# Patient Record
Sex: MALE | Race: WHITE | NOT HISPANIC OR LATINO | Employment: FULL TIME | URBAN - METROPOLITAN AREA
[De-identification: names, ages, dates, MRNs, and addresses within clinical notes are randomized per-mention and may not be internally consistent; named-entity substitution may affect disease eponyms.]

---

## 2020-07-27 ENCOUNTER — OFFICE VISIT (OUTPATIENT)
Dept: FAMILY MEDICINE CLINIC | Facility: CLINIC | Age: 25
End: 2020-07-27

## 2020-07-27 ENCOUNTER — LAB (OUTPATIENT)
Dept: LAB | Facility: HOSPITAL | Age: 25
End: 2020-07-27

## 2020-07-27 VITALS
HEIGHT: 65 IN | WEIGHT: 130.21 LBS | DIASTOLIC BLOOD PRESSURE: 68 MMHG | SYSTOLIC BLOOD PRESSURE: 96 MMHG | BODY MASS INDEX: 21.69 KG/M2 | OXYGEN SATURATION: 99 % | HEART RATE: 58 BPM

## 2020-07-27 DIAGNOSIS — Z83.3 FAMILY HISTORY OF DIABETES MELLITUS: ICD-10-CM

## 2020-07-27 DIAGNOSIS — Z13.1 SCREENING FOR DIABETES MELLITUS: ICD-10-CM

## 2020-07-27 DIAGNOSIS — I95.9 HYPOTENSION, UNSPECIFIED HYPOTENSION TYPE: ICD-10-CM

## 2020-07-27 DIAGNOSIS — Z13.21 ENCOUNTER FOR VITAMIN DEFICIENCY SCREENING: ICD-10-CM

## 2020-07-27 DIAGNOSIS — G43.019 INTRACTABLE MIGRAINE WITHOUT AURA AND WITHOUT STATUS MIGRAINOSUS: ICD-10-CM

## 2020-07-27 DIAGNOSIS — Z13.6 SCREENING FOR CARDIOVASCULAR CONDITION: ICD-10-CM

## 2020-07-27 DIAGNOSIS — G43.019 INTRACTABLE MIGRAINE WITHOUT AURA AND WITHOUT STATUS MIGRAINOSUS: Primary | ICD-10-CM

## 2020-07-27 LAB
25(OH)D3 SERPL-MCNC: 16.6 NG/ML (ref 30–100)
ALBUMIN SERPL-MCNC: 4.4 G/DL (ref 3.5–5.2)
ALBUMIN/GLOB SERPL: 1.4 G/DL
ALP SERPL-CCNC: 66 U/L (ref 39–117)
ALT SERPL W P-5'-P-CCNC: 17 U/L (ref 1–41)
ANION GAP SERPL CALCULATED.3IONS-SCNC: 6.6 MMOL/L (ref 5–15)
AST SERPL-CCNC: 19 U/L (ref 1–40)
BACTERIA UR QL AUTO: ABNORMAL /HPF
BASOPHILS # BLD AUTO: 0.05 10*3/MM3 (ref 0–0.2)
BASOPHILS NFR BLD AUTO: 0.8 % (ref 0–1.5)
BILIRUB SERPL-MCNC: 0.3 MG/DL (ref 0–1.2)
BILIRUB UR QL STRIP: NEGATIVE
BUN SERPL-MCNC: 12 MG/DL (ref 6–20)
BUN/CREAT SERPL: 12.6 (ref 7–25)
CALCIUM SPEC-SCNC: 9.7 MG/DL (ref 8.6–10.5)
CHLORIDE SERPL-SCNC: 104 MMOL/L (ref 98–107)
CHOLEST SERPL-MCNC: 170 MG/DL (ref 0–200)
CLARITY UR: CLEAR
CO2 SERPL-SCNC: 28.4 MMOL/L (ref 22–29)
COLOR UR: YELLOW
CREAT SERPL-MCNC: 0.95 MG/DL (ref 0.76–1.27)
DEPRECATED RDW RBC AUTO: 41.4 FL (ref 37–54)
EOSINOPHIL # BLD AUTO: 0.21 10*3/MM3 (ref 0–0.4)
EOSINOPHIL NFR BLD AUTO: 3.4 % (ref 0.3–6.2)
ERYTHROCYTE [DISTWIDTH] IN BLOOD BY AUTOMATED COUNT: 13.3 % (ref 12.3–15.4)
GFR SERPL CREATININE-BSD FRML MDRD: 99 ML/MIN/1.73
GLOBULIN UR ELPH-MCNC: 3.1 GM/DL
GLUCOSE SERPL-MCNC: 71 MG/DL (ref 65–99)
GLUCOSE UR STRIP-MCNC: NEGATIVE MG/DL
HBA1C MFR BLD: 5.5 % (ref 4.8–5.6)
HCT VFR BLD AUTO: 43.5 % (ref 37.5–51)
HDLC SERPL-MCNC: 54 MG/DL (ref 40–60)
HGB BLD-MCNC: 14.7 G/DL (ref 13–17.7)
HGB UR QL STRIP.AUTO: NEGATIVE
HYALINE CASTS UR QL AUTO: ABNORMAL /LPF
IMM GRANULOCYTES # BLD AUTO: 0.02 10*3/MM3 (ref 0–0.05)
IMM GRANULOCYTES NFR BLD AUTO: 0.3 % (ref 0–0.5)
KETONES UR QL STRIP: NEGATIVE
LDLC SERPL CALC-MCNC: 103 MG/DL (ref 0–100)
LDLC/HDLC SERPL: 1.91 {RATIO}
LEUKOCYTE ESTERASE UR QL STRIP.AUTO: ABNORMAL
LYMPHOCYTES # BLD AUTO: 1.49 10*3/MM3 (ref 0.7–3.1)
LYMPHOCYTES NFR BLD AUTO: 24 % (ref 19.6–45.3)
MCH RBC QN AUTO: 28.4 PG (ref 26.6–33)
MCHC RBC AUTO-ENTMCNC: 33.8 G/DL (ref 31.5–35.7)
MCV RBC AUTO: 84.1 FL (ref 79–97)
MONOCYTES # BLD AUTO: 0.5 10*3/MM3 (ref 0.1–0.9)
MONOCYTES NFR BLD AUTO: 8 % (ref 5–12)
NEUTROPHILS NFR BLD AUTO: 3.95 10*3/MM3 (ref 1.7–7)
NEUTROPHILS NFR BLD AUTO: 63.5 % (ref 42.7–76)
NITRITE UR QL STRIP: NEGATIVE
NRBC BLD AUTO-RTO: 0 /100 WBC (ref 0–0.2)
PH UR STRIP.AUTO: 6.5 [PH] (ref 5–8)
PLATELET # BLD AUTO: 240 10*3/MM3 (ref 140–450)
PMV BLD AUTO: 11.4 FL (ref 6–12)
POTASSIUM SERPL-SCNC: 4.3 MMOL/L (ref 3.5–5.2)
PROT SERPL-MCNC: 7.5 G/DL (ref 6–8.5)
PROT UR QL STRIP: NEGATIVE
RBC # BLD AUTO: 5.17 10*6/MM3 (ref 4.14–5.8)
RBC # UR: ABNORMAL /HPF
REF LAB TEST METHOD: ABNORMAL
SODIUM SERPL-SCNC: 139 MMOL/L (ref 136–145)
SP GR UR STRIP: 1.02 (ref 1–1.03)
SQUAMOUS #/AREA URNS HPF: ABNORMAL /HPF
T4 FREE SERPL-MCNC: 1.12 NG/DL (ref 0.93–1.7)
TRIGL SERPL-MCNC: 65 MG/DL (ref 0–150)
TSH SERPL DL<=0.05 MIU/L-ACNC: 1.27 UIU/ML (ref 0.27–4.2)
UROBILINOGEN UR QL STRIP: ABNORMAL
VLDLC SERPL-MCNC: 13 MG/DL (ref 5–40)
WBC # BLD AUTO: 6.22 10*3/MM3 (ref 3.4–10.8)
WBC UR QL AUTO: ABNORMAL /HPF

## 2020-07-27 PROCEDURE — 80053 COMPREHEN METABOLIC PANEL: CPT

## 2020-07-27 PROCEDURE — 99203 OFFICE O/P NEW LOW 30 MIN: CPT | Performed by: INTERNAL MEDICINE

## 2020-07-27 PROCEDURE — 83036 HEMOGLOBIN GLYCOSYLATED A1C: CPT

## 2020-07-27 PROCEDURE — 80061 LIPID PANEL: CPT

## 2020-07-27 PROCEDURE — 85025 COMPLETE CBC W/AUTO DIFF WBC: CPT

## 2020-07-27 PROCEDURE — 81001 URINALYSIS AUTO W/SCOPE: CPT

## 2020-07-27 PROCEDURE — 84443 ASSAY THYROID STIM HORMONE: CPT

## 2020-07-27 PROCEDURE — 84439 ASSAY OF FREE THYROXINE: CPT

## 2020-07-27 PROCEDURE — 87086 URINE CULTURE/COLONY COUNT: CPT

## 2020-07-27 PROCEDURE — 82306 VITAMIN D 25 HYDROXY: CPT

## 2020-07-27 RX ORDER — DIPHENHYDRAMINE HCL 25 MG
TABLET ORAL
Qty: 10 TABLET | Refills: 5 | Status: SHIPPED | OUTPATIENT
Start: 2020-07-27 | End: 2021-08-09

## 2020-07-27 RX ORDER — SUMATRIPTAN 50 MG/1
TABLET, FILM COATED ORAL
Qty: 9 TABLET | Refills: 5 | Status: SHIPPED | OUTPATIENT
Start: 2020-07-27

## 2020-07-27 RX ORDER — PROCHLORPERAZINE MALEATE 10 MG
TABLET ORAL
Qty: 10 TABLET | Refills: 5 | Status: SHIPPED | OUTPATIENT
Start: 2020-07-27

## 2020-07-27 NOTE — PROGRESS NOTES
Chief Complaint   Patient presents with   • Establish Care   • Annual Exam     Patient is not fasting today       HPI:  Krish Patterson is a 24 y.o. male who presents today for establish care and migraines.  Patient has had migraines for the last several years.  He has a family history of diabetes and hypertension.     ROS:  Constitutional: no fevers, night sweats or unexplained weight loss  Eyes: no vision changes  ENT: no runny nose, ear pain, sore throat  Cardio: no chest pain, palpitations  Pulm: no shortness of breath, wheezing, or cough  GI: no abdominal pain or changes in bowel movements  : no difficulty urinating  MSK: no difficulty ambulating, no joint pain  Neuro: no weakness, dizziness or headache  Psych: no trouble sleeping  Endo: no change in appetite      History reviewed. No pertinent past medical history.   Family History   Problem Relation Age of Onset   • Hypertension Father    • Diabetes Father       Social History     Socioeconomic History   • Marital status: Single     Spouse name: Not on file   • Number of children: Not on file   • Years of education: Not on file   • Highest education level: Not on file   Tobacco Use   • Smoking status: Former Smoker   • Smokeless tobacco: Former User   Substance and Sexual Activity   • Drug use: Never      No Known Allergies     There is no immunization history on file for this patient.     PE:  Vitals:    07/27/20 1008   BP: 96/68   Pulse:    SpO2:       Body mass index is 21.67 kg/m².    Gen Appearance: NAD  HEENT: Normocephalic, PERRLA, no thyromegaly, trache midline  Heart: RRR, normal S1 and S2, no murmur  Lungs: CTA b/l, no wheezing, no crackles  Abdomen: Soft, non-tender, non-distended, no guarding and BSx4  MSK: Moves all extremities well, normal gait, no peripheral edema  Pulses: Palpable and equal b/l  Lymph nodes: No palpable lymphadenopathy   Neuro: No focal deficits, CN 2-12 intact      Current Outpatient Medications   Medication Sig  Dispense Refill   • diphenhydrAMINE (Benadryl Allergy) 25 MG tablet Take every 6 hours as needed for headaches with Compazine 10 tablet 5   • prochlorperazine (COMPAZINE) 10 MG tablet Take 1 tab as needed every 8 hours for migraine.  Take this with 25 mg of Benadryl. 10 tablet 5   • SUMAtriptan (IMITREX) 50 MG tablet Take one tablet at onset of headache. May repeat dose one time in 2 hours if headache not relieved. 9 tablet 5     No current facility-administered medications for this visit.           Krish was seen today for establish care and annual exam.    Diagnoses and all orders for this visit:    Intractable migraine without aura and without status migrainosus  -     CBC & Differential; Future  -     Comprehensive Metabolic Panel; Future  -     Hemoglobin A1c; Future  -     Lipid Panel; Future  -     Urinalysis With Culture If Indicated - Urine, Clean Catch; Future  -     Vitamin D 25 Hydroxy; Future  -     TSH+Free T4; Future  -     SUMAtriptan (IMITREX) 50 MG tablet; Take one tablet at onset of headache. May repeat dose one time in 2 hours if headache not relieved.  -     prochlorperazine (COMPAZINE) 10 MG tablet; Take 1 tab as needed every 8 hours for migraine.  Take this with 25 mg of Benadryl.  -     diphenhydrAMINE (Benadryl Allergy) 25 MG tablet; Take every 6 hours as needed for headaches with Compazine  Chronic migraines, usually 1-2 severe per month. No obvious aura but he does get light sensitivity prior to headache. He does not want to try preventative medication at this time.  Will trial Imitrex and Compazine and Benadryl as needed over the next 6 weeks.  He does report issues with fogginess and cognitive impairment after headaches.  Recommend checking head CT for evaluation.  Will keep headache diary over the next 4 to 6 weeks.  Per patient this is possibly caffeine related.  Family history of diabetes mellitus  -     CBC & Differential; Future  -     Comprehensive Metabolic Panel; Future  -      Hemoglobin A1c; Future  -     Lipid Panel; Future  -     Urinalysis With Culture If Indicated - Urine, Clean Catch; Future  -     Vitamin D 25 Hydroxy; Future  -     TSH+Free T4; Future    Hypotension, unspecified hypotension type  -     CBC & Differential; Future  -     Comprehensive Metabolic Panel; Future  -     Hemoglobin A1c; Future  -     Lipid Panel; Future  -     Urinalysis With Culture If Indicated - Urine, Clean Catch; Future  -     Vitamin D 25 Hydroxy; Future  -     TSH+Free T4; Future  Low blood pressure on repeat here in office.  Denies any specific dizziness or lightheadedness.  Recommend checking screening blood work.  Follow-up 6 weeks for physical.  Screening for cardiovascular condition  -     CBC & Differential; Future  -     Comprehensive Metabolic Panel; Future  -     Hemoglobin A1c; Future  -     Lipid Panel; Future  -     Urinalysis With Culture If Indicated - Urine, Clean Catch; Future  -     Vitamin D 25 Hydroxy; Future  -     TSH+Free T4; Future    Screening for diabetes mellitus  -     Hemoglobin A1c; Future    Encounter for vitamin deficiency screening  -     Vitamin D 25 Hydroxy; Future         Return in about 6 weeks (around 9/7/2020) for Annual.     Please note that portions of this document were completed with a voice recognition program. Efforts were made to edit the dictations, but occasionally words are mis-transcribed.

## 2020-07-28 ENCOUNTER — TELEPHONE (OUTPATIENT)
Dept: FAMILY MEDICINE CLINIC | Facility: CLINIC | Age: 25
End: 2020-07-28

## 2020-07-28 LAB — BACTERIA SPEC AEROBE CULT: NO GROWTH

## 2020-07-28 NOTE — TELEPHONE ENCOUNTER
----- Message from Darshan Blanco DO sent at 7/28/2020  8:33 AM EDT -----  Please let patient know that blood work is normal besides low vitamin D.  Recommend taking 2000 units over-the-counter vitamin D3 daily.  Let me know if he has any questions or concerns.    Notes recorded by Amanda Duncan MA on 7/28/2020 at 1:28 PM EDT  LVM for pt to return call  ------

## 2021-08-09 ENCOUNTER — OFFICE VISIT (OUTPATIENT)
Dept: FAMILY MEDICINE CLINIC | Facility: CLINIC | Age: 26
End: 2021-08-09

## 2021-08-09 ENCOUNTER — LAB (OUTPATIENT)
Dept: LAB | Facility: HOSPITAL | Age: 26
End: 2021-08-09

## 2021-08-09 VITALS
WEIGHT: 135 LBS | OXYGEN SATURATION: 98 % | SYSTOLIC BLOOD PRESSURE: 118 MMHG | BODY MASS INDEX: 22.47 KG/M2 | HEART RATE: 66 BPM | DIASTOLIC BLOOD PRESSURE: 60 MMHG

## 2021-08-09 DIAGNOSIS — Z11.3 SCREENING EXAMINATION FOR STD (SEXUALLY TRANSMITTED DISEASE): Primary | ICD-10-CM

## 2021-08-09 DIAGNOSIS — Z11.3 SCREENING EXAMINATION FOR STD (SEXUALLY TRANSMITTED DISEASE): ICD-10-CM

## 2021-08-09 DIAGNOSIS — Z00.00 PREVENTATIVE HEALTH CARE: ICD-10-CM

## 2021-08-09 DIAGNOSIS — G43.009 MIGRAINE WITHOUT AURA AND WITHOUT STATUS MIGRAINOSUS, NOT INTRACTABLE: ICD-10-CM

## 2021-08-09 PROCEDURE — 99213 OFFICE O/P EST LOW 20 MIN: CPT | Performed by: INTERNAL MEDICINE

## 2021-08-09 PROCEDURE — 87591 N.GONORRHOEAE DNA AMP PROB: CPT

## 2021-08-09 PROCEDURE — 87491 CHLMYD TRACH DNA AMP PROBE: CPT

## 2021-08-09 PROCEDURE — 87661 TRICHOMONAS VAGINALIS AMPLIF: CPT

## 2021-08-09 NOTE — PROGRESS NOTES
Chief Complaint   Patient presents with   • Exposure to STD     concerns       HPI:  Krish Patterson is a 25 y.o. male who presents today for follow-up.  No acute concerns today.  He would like to be checked for STDs.  Asymptomatic today.  Migraines are stable.    ROS:  Constitutional: no fevers, night sweats or unexplained weight loss  Eyes: no vision changes  ENT: no runny nose, ear pain, sore throat  Cardio: no chest pain, palpitations  Pulm: no shortness of breath, wheezing, or cough  GI: no abdominal pain or changes in bowel movements  : no difficulty urinating  MSK: no difficulty ambulating, no joint pain  Neuro: no weakness, dizziness or headache  Psych: no trouble sleeping  Endo: no change in appetite      Past Medical History:   Diagnosis Date   • Headache       Family History   Problem Relation Age of Onset   • Hypertension Father    • Diabetes Father       Social History     Socioeconomic History   • Marital status: Single     Spouse name: Not on file   • Number of children: Not on file   • Years of education: Not on file   • Highest education level: Not on file   Tobacco Use   • Smoking status: Former Smoker   • Smokeless tobacco: Former User   Substance and Sexual Activity   • Drug use: Never      No Known Allergies     There is no immunization history on file for this patient.     PE:  Vitals:    08/09/21 1520   BP: 118/60   Pulse: 66   SpO2: 98%      Body mass index is 22.47 kg/m².    Gen Appearance: NAD  HEENT: Normocephalic, PERRLA, no thyromegaly, trache midline  Heart: RRR, normal S1 and S2, no murmur  Lungs: CTA b/l, no wheezing, no crackles  Abdomen: Soft, non-tender, non-distended, no guarding and BSx4  MSK: Moves all extremities well, normal gait, no peripheral edema  Pulses: Palpable and equal b/l  Lymph nodes: No palpable lymphadenopathy   Neuro: No focal deficits      Current Outpatient Medications   Medication Sig Dispense Refill   • prochlorperazine (COMPAZINE) 10 MG tablet Take 1  tab as needed every 8 hours for migraine.  Take this with 25 mg of Benadryl. 10 tablet 5   • SUMAtriptan (IMITREX) 50 MG tablet Take one tablet at onset of headache. May repeat dose one time in 2 hours if headache not relieved. 9 tablet 5     No current facility-administered medications for this visit.      STD screening today.  Continue Imitrex as needed for migraines.  See back in 1 month or annual physical.  Recommend blood work beforehand.    Diagnoses and all orders for this visit:    1. Screening examination for STD (sexually transmitted disease) (Primary)  -     Chlamydia trachomatis, Neisseria gonorrhoeae, Trichomonas vaginalis, PCR - Urine, Urine, Random Void; Future  -     Hepatitis C Antibody; Future  -     HIV-1 / O / 2 Ag / Antibody 4th Generation; Future  -     RPR; Future  -     Chlamydia trachomatis, Neisseria gonorrhoeae, PCR - Urine, Urine, Clean Catch; Future    2. Preventative health care  -     CBC & Differential; Future  -     Comprehensive Metabolic Panel; Future  -     Hemoglobin A1c; Future  -     Lipid Panel; Future  -     TSH+Free T4; Future  -     Urinalysis With Culture If Indicated - Urine, Clean Catch; Future  -     Vitamin D 25 Hydroxy; Future    3. Migraine without aura and without status migrainosus, not intractable         Return in about 4 weeks (around 9/6/2021) for Annual.     Please note that portions of this document were completed with a voice recognition program. Efforts were made to edit the dictations, but occasionally words are mis-transcribed.

## 2021-08-12 LAB
C TRACH RRNA SPEC QL NAA+PROBE: NEGATIVE
N GONORRHOEA RRNA SPEC QL NAA+PROBE: NEGATIVE
T VAGINALIS DNA SPEC QL NAA+PROBE: NEGATIVE

## 2021-08-16 ENCOUNTER — LAB (OUTPATIENT)
Dept: LAB | Facility: HOSPITAL | Age: 26
End: 2021-08-16

## 2021-08-16 DIAGNOSIS — Z11.3 SCREENING EXAMINATION FOR STD (SEXUALLY TRANSMITTED DISEASE): ICD-10-CM

## 2021-08-16 DIAGNOSIS — Z00.00 PREVENTATIVE HEALTH CARE: ICD-10-CM

## 2021-08-16 LAB
25(OH)D3 SERPL-MCNC: 21.4 NG/ML (ref 30–100)
ALBUMIN SERPL-MCNC: 4.6 G/DL (ref 3.5–5.2)
ALBUMIN/GLOB SERPL: 1.8 G/DL
ALP SERPL-CCNC: 83 U/L (ref 39–117)
ALT SERPL W P-5'-P-CCNC: 20 U/L (ref 1–41)
ANION GAP SERPL CALCULATED.3IONS-SCNC: 8.1 MMOL/L (ref 5–15)
AST SERPL-CCNC: 28 U/L (ref 1–40)
BASOPHILS # BLD AUTO: 0.04 10*3/MM3 (ref 0–0.2)
BASOPHILS NFR BLD AUTO: 0.7 % (ref 0–1.5)
BILIRUB SERPL-MCNC: 0.3 MG/DL (ref 0–1.2)
BUN SERPL-MCNC: 15 MG/DL (ref 6–20)
BUN/CREAT SERPL: 14.7 (ref 7–25)
CALCIUM SPEC-SCNC: 9.2 MG/DL (ref 8.6–10.5)
CHLORIDE SERPL-SCNC: 103 MMOL/L (ref 98–107)
CHOLEST SERPL-MCNC: 173 MG/DL (ref 0–200)
CO2 SERPL-SCNC: 25.9 MMOL/L (ref 22–29)
CREAT SERPL-MCNC: 1.02 MG/DL (ref 0.76–1.27)
DEPRECATED RDW RBC AUTO: 42.6 FL (ref 37–54)
EOSINOPHIL # BLD AUTO: 0.15 10*3/MM3 (ref 0–0.4)
EOSINOPHIL NFR BLD AUTO: 2.7 % (ref 0.3–6.2)
ERYTHROCYTE [DISTWIDTH] IN BLOOD BY AUTOMATED COUNT: 13.4 % (ref 12.3–15.4)
GFR SERPL CREATININE-BSD FRML MDRD: 91 ML/MIN/1.73
GLOBULIN UR ELPH-MCNC: 2.6 GM/DL
GLUCOSE SERPL-MCNC: 73 MG/DL (ref 65–99)
HBA1C MFR BLD: 4.8 % (ref 4.8–5.6)
HCT VFR BLD AUTO: 45.7 % (ref 37.5–51)
HCV AB SER DONR QL: NORMAL
HDLC SERPL-MCNC: 59 MG/DL (ref 40–60)
HGB BLD-MCNC: 14.8 G/DL (ref 13–17.7)
HIV1+2 AB SER QL: NORMAL
IMM GRANULOCYTES # BLD AUTO: 0.01 10*3/MM3 (ref 0–0.05)
IMM GRANULOCYTES NFR BLD AUTO: 0.2 % (ref 0–0.5)
LDLC SERPL CALC-MCNC: 105 MG/DL (ref 0–100)
LDLC/HDLC SERPL: 1.79 {RATIO}
LYMPHOCYTES # BLD AUTO: 1.63 10*3/MM3 (ref 0.7–3.1)
LYMPHOCYTES NFR BLD AUTO: 29 % (ref 19.6–45.3)
MCH RBC QN AUTO: 28.2 PG (ref 26.6–33)
MCHC RBC AUTO-ENTMCNC: 32.4 G/DL (ref 31.5–35.7)
MCV RBC AUTO: 87.2 FL (ref 79–97)
MONOCYTES # BLD AUTO: 0.31 10*3/MM3 (ref 0.1–0.9)
MONOCYTES NFR BLD AUTO: 5.5 % (ref 5–12)
NEUTROPHILS NFR BLD AUTO: 3.49 10*3/MM3 (ref 1.7–7)
NEUTROPHILS NFR BLD AUTO: 61.9 % (ref 42.7–76)
NRBC BLD AUTO-RTO: 0 /100 WBC (ref 0–0.2)
PLATELET # BLD AUTO: 231 10*3/MM3 (ref 140–450)
PMV BLD AUTO: 11.5 FL (ref 6–12)
POTASSIUM SERPL-SCNC: 4.2 MMOL/L (ref 3.5–5.2)
PROT SERPL-MCNC: 7.2 G/DL (ref 6–8.5)
RBC # BLD AUTO: 5.24 10*6/MM3 (ref 4.14–5.8)
RPR SER QL: NORMAL
SODIUM SERPL-SCNC: 137 MMOL/L (ref 136–145)
T4 FREE SERPL-MCNC: 0.94 NG/DL (ref 0.93–1.7)
TRIGL SERPL-MCNC: 42 MG/DL (ref 0–150)
TSH SERPL DL<=0.05 MIU/L-ACNC: 1.06 UIU/ML (ref 0.27–4.2)
VLDLC SERPL-MCNC: 9 MG/DL (ref 5–40)
WBC # BLD AUTO: 5.63 10*3/MM3 (ref 3.4–10.8)

## 2021-08-16 PROCEDURE — 84439 ASSAY OF FREE THYROXINE: CPT

## 2021-08-16 PROCEDURE — 86592 SYPHILIS TEST NON-TREP QUAL: CPT

## 2021-08-16 PROCEDURE — 86803 HEPATITIS C AB TEST: CPT

## 2021-08-16 PROCEDURE — G0432 EIA HIV-1/HIV-2 SCREEN: HCPCS

## 2021-08-16 PROCEDURE — 84443 ASSAY THYROID STIM HORMONE: CPT

## 2021-08-16 PROCEDURE — 80053 COMPREHEN METABOLIC PANEL: CPT

## 2021-08-16 PROCEDURE — 80061 LIPID PANEL: CPT

## 2021-08-16 PROCEDURE — 82306 VITAMIN D 25 HYDROXY: CPT

## 2021-08-16 PROCEDURE — 85025 COMPLETE CBC W/AUTO DIFF WBC: CPT

## 2021-08-16 PROCEDURE — 83036 HEMOGLOBIN GLYCOSYLATED A1C: CPT

## 2021-08-18 ENCOUNTER — APPOINTMENT (OUTPATIENT)
Dept: GENERAL RADIOLOGY | Facility: HOSPITAL | Age: 26
End: 2021-08-18

## 2021-08-18 ENCOUNTER — OFFICE VISIT (OUTPATIENT)
Dept: FAMILY MEDICINE CLINIC | Facility: CLINIC | Age: 26
End: 2021-08-18

## 2021-08-18 VITALS
WEIGHT: 138.2 LBS | DIASTOLIC BLOOD PRESSURE: 64 MMHG | OXYGEN SATURATION: 99 % | HEART RATE: 70 BPM | HEIGHT: 65 IN | SYSTOLIC BLOOD PRESSURE: 118 MMHG | BODY MASS INDEX: 23.03 KG/M2

## 2021-08-18 DIAGNOSIS — M25.561 CHRONIC PAIN OF RIGHT KNEE: ICD-10-CM

## 2021-08-18 DIAGNOSIS — G89.29 CHRONIC PAIN OF RIGHT KNEE: ICD-10-CM

## 2021-08-18 DIAGNOSIS — E55.9 VITAMIN D DEFICIENCY: ICD-10-CM

## 2021-08-18 DIAGNOSIS — Z00.00 PREVENTATIVE HEALTH CARE: Primary | ICD-10-CM

## 2021-08-18 PROCEDURE — 99395 PREV VISIT EST AGE 18-39: CPT | Performed by: INTERNAL MEDICINE

## 2021-08-18 NOTE — PROGRESS NOTES
Chief Complaint   Patient presents with   • Annual Exam       HPI:  Krish Patterson is a 25 y.o. male who presents today for annual physical. Would like to discuss chronic right knee pain ongoing for several years.  This causes pain when standing for extended duration of time.  Particularly at work    ROS:  Constitutional: no fevers, night sweats or unexplained weight loss  Eyes: no vision changes  ENT: no runny nose, ear pain, sore throat  Cardio: no chest pain, palpitations  Pulm: no shortness of breath, wheezing, or cough  GI: no abdominal pain or changes in bowel movements  : no difficulty urinating  MSK: no difficulty ambulating, + joint pain  Neuro: no weakness, dizziness or headache  Psych: no trouble sleeping  Endo: no change in appetite      Past Medical History:   Diagnosis Date   • Headache       Family History   Problem Relation Age of Onset   • Hypertension Father    • Diabetes Father       Social History     Socioeconomic History   • Marital status: Single     Spouse name: Not on file   • Number of children: Not on file   • Years of education: Not on file   • Highest education level: Not on file   Tobacco Use   • Smoking status: Former Smoker   • Smokeless tobacco: Former User   Substance and Sexual Activity   • Drug use: Never      No Known Allergies   Immunization History   Administered Date(s) Administered   • COVID-19 (PFIZER) 06/11/2021, 07/02/2021        PE:  Vitals:    08/18/21 1309   BP: 118/64   Pulse: 70   SpO2: 99%      Body mass index is 23 kg/m².    Gen Appearance: NAD  HEENT: Normocephalic, PERRLA, no thyromegaly, trache midline  Heart: RRR, normal S1 and S2, no murmur  Lungs: CTA b/l, no wheezing, no crackles  Abdomen: Soft, non-tender, non-distended, no guarding and BSx4  MSK: Moves all extremities well, normal gait, no peripheral edema, prepatellar pain on the right  Pulses: Palpable and equal b/l  Lymph nodes: No palpable lymphadenopathy   Neuro: No focal deficits      Current  Outpatient Medications   Medication Sig Dispense Refill   • prochlorperazine (COMPAZINE) 10 MG tablet Take 1 tab as needed every 8 hours for migraine.  Take this with 25 mg of Benadryl. 10 tablet 5   • SUMAtriptan (IMITREX) 50 MG tablet Take one tablet at onset of headache. May repeat dose one time in 2 hours if headache not relieved. 9 tablet 5   • vitamin D3 (Vitamin D) 125 MCG (5000 UT) capsule capsule Take 1 capsule by mouth Daily. 90 capsule 3     No current facility-administered medications for this visit.        Diagnoses and all orders for this visit:    1. Preventative health care (Primary)  Counseled on healthy weight, nutrition, physical activity, cancer screening, and immunizations.  Patient reports having Covid vaccination already.    2. Vitamin D deficiency  -     vitamin D3 (Vitamin D) 125 MCG (5000 UT) capsule capsule; Take 1 capsule by mouth Daily.  Dispense: 90 capsule; Refill: 3    3. Chronic pain of right knee  -     XR Knee 1 or 2 View Right; Future  -     Ambulatory Referral to Physical Therapy Evaluate and treat  Suspect prepatellar bursitis.  Recommend establishing care with physical therapy.  If no improvement will need orthopedic referral.  Checking x-ray today.       No follow-ups on file.     Please note that portions of this document were completed with a voice recognition program. Efforts were made to edit the dictations, but occasionally words are mis-transcribed.

## 2022-06-22 ENCOUNTER — HOSPITAL ENCOUNTER (OUTPATIENT)
Dept: GENERAL RADIOLOGY | Facility: HOSPITAL | Age: 27
Discharge: HOME | End: 2022-06-22
Admitting: INTERNAL MEDICINE

## 2022-06-22 ENCOUNTER — OFFICE VISIT (OUTPATIENT)
Dept: FAMILY MEDICINE CLINIC | Facility: CLINIC | Age: 27
End: 2022-06-22

## 2022-06-22 VITALS
HEART RATE: 94 BPM | HEIGHT: 65 IN | SYSTOLIC BLOOD PRESSURE: 112 MMHG | DIASTOLIC BLOOD PRESSURE: 78 MMHG | BODY MASS INDEX: 22.49 KG/M2 | WEIGHT: 135 LBS | OXYGEN SATURATION: 99 %

## 2022-06-22 DIAGNOSIS — G89.29 CHRONIC RIGHT-SIDED LOW BACK PAIN, UNSPECIFIED WHETHER SCIATICA PRESENT: ICD-10-CM

## 2022-06-22 DIAGNOSIS — M54.50 CHRONIC RIGHT-SIDED LOW BACK PAIN, UNSPECIFIED WHETHER SCIATICA PRESENT: ICD-10-CM

## 2022-06-22 DIAGNOSIS — M25.561 CHRONIC PAIN OF RIGHT KNEE: ICD-10-CM

## 2022-06-22 DIAGNOSIS — G89.29 CHRONIC PAIN OF RIGHT KNEE: ICD-10-CM

## 2022-06-22 DIAGNOSIS — M25.559 HIP PAIN: ICD-10-CM

## 2022-06-22 DIAGNOSIS — M25.559 HIP PAIN: Primary | ICD-10-CM

## 2022-06-22 PROCEDURE — 73502 X-RAY EXAM HIP UNI 2-3 VIEWS: CPT

## 2022-06-22 PROCEDURE — 99214 OFFICE O/P EST MOD 30 MIN: CPT | Performed by: INTERNAL MEDICINE

## 2022-06-22 PROCEDURE — 72100 X-RAY EXAM L-S SPINE 2/3 VWS: CPT

## 2022-06-22 PROCEDURE — 73562 X-RAY EXAM OF KNEE 3: CPT

## 2022-06-22 RX ORDER — METHYLPREDNISOLONE 4 MG/1
TABLET ORAL
Qty: 21 TABLET | Refills: 0 | Status: SHIPPED | OUTPATIENT
Start: 2022-06-22

## 2022-06-22 RX ORDER — NAPROXEN 500 MG/1
500 TABLET ORAL 2 TIMES DAILY WITH MEALS
Qty: 14 TABLET | Refills: 0 | Status: SHIPPED | OUTPATIENT
Start: 2022-06-22 | End: 2022-06-29

## 2022-06-22 NOTE — PROGRESS NOTES
Chief Complaint   Patient presents with   • Leg Pain     Right side from hip to ankle. No injury noted    • Back Pain     Excedrin has been helping with pain        HPI:  Krish Patterson is a 26 y.o. male who presents today for right sided low back, hip and knee pain.  Pain is radiating down to his ankle.  No trauma or injury.  Ongoing for the last several months.  History of chronic knee and back pain.    ROS:  Constitutional: no fevers, night sweats or unexplained weight loss  Eyes: no vision changes  ENT: no runny nose, ear pain, sore throat  Cardio: no chest pain, palpitations  Pulm: no shortness of breath, wheezing, or cough  GI: no abdominal pain or changes in bowel movements  : no difficulty urinating  MSK: no difficulty ambulating, + joint pain  Neuro: no weakness, dizziness or headache  Psych: no trouble sleeping  Endo: no change in appetite      Past Medical History:   Diagnosis Date   • Headache       Family History   Problem Relation Age of Onset   • Hypertension Father    • Diabetes Father       Social History     Socioeconomic History   • Marital status: Single   Tobacco Use   • Smoking status: Former Smoker   • Smokeless tobacco: Former User   Substance and Sexual Activity   • Drug use: Never      No Known Allergies   Immunization History   Administered Date(s) Administered   • COVID-19 (PFIZER) PURPLE CAP 06/11/2021, 07/02/2021        PE:  Vitals:    06/22/22 0900   BP: 112/78   Pulse: 94   SpO2: 99%      Body mass index is 22.47 kg/m².    Gen Appearance: NAD  HEENT: Normocephalic, PERRLA, no thyromegaly, trache midline  Heart: RRR, normal S1 and S2, no murmur  Lungs: CTA b/l, no wheezing, no crackles  Abdomen: Soft, non-tender, non-distended, no guarding and BSx4  MSK: Moves all extremities well, normal gait, no peripheral edema  Pulses: Palpable and equal b/l  Lymph nodes: No palpable lymphadenopathy   Neuro: No focal deficits      Current Outpatient Medications   Medication Sig Dispense  Refill   • prochlorperazine (COMPAZINE) 10 MG tablet Take 1 tab as needed every 8 hours for migraine.  Take this with 25 mg of Benadryl. 10 tablet 5   • SUMAtriptan (IMITREX) 50 MG tablet Take one tablet at onset of headache. May repeat dose one time in 2 hours if headache not relieved. 9 tablet 5   • vitamin D3 (Vitamin D) 125 MCG (5000 UT) capsule capsule Take 1 capsule by mouth Daily. 90 capsule 3   • methylPREDNISolone (MEDROL) 4 MG dose pack Take as directed on package instructions. 21 tablet 0   • naproxen (Naprosyn) 500 MG tablet Take 1 tablet by mouth 2 (Two) Times a Day With Meals for 7 days. 14 tablet 0     No current facility-administered medications for this visit.      When checking baseline x-rays and starting with physical therapy.  Significant limited mobility of right leg.  See back in 4 to 6 weeks.  Recommend checking MRI versus orthopedic referral if no improvement.  Steroid burst and naproxen for today.    Diagnoses and all orders for this visit:    1. Hip pain (Primary)  -     Ambulatory Referral to Physical Therapy Evaluate and treat  -     XR Hip With or Without Pelvis 2 - 3 View Right; Future  -     XR Spine Lumbar 2 or 3 View; Future  -     naproxen (Naprosyn) 500 MG tablet; Take 1 tablet by mouth 2 (Two) Times a Day With Meals for 7 days.  Dispense: 14 tablet; Refill: 0  -     methylPREDNISolone (MEDROL) 4 MG dose pack; Take as directed on package instructions.  Dispense: 21 tablet; Refill: 0    2. Chronic right-sided low back pain, unspecified whether sciatica present  -     Ambulatory Referral to Physical Therapy Evaluate and treat  -     XR Spine Lumbar 2 or 3 View; Future  -     naproxen (Naprosyn) 500 MG tablet; Take 1 tablet by mouth 2 (Two) Times a Day With Meals for 7 days.  Dispense: 14 tablet; Refill: 0  -     methylPREDNISolone (MEDROL) 4 MG dose pack; Take as directed on package instructions.  Dispense: 21 tablet; Refill: 0    3. Chronic pain of right knee  -     Ambulatory  Referral to Physical Therapy Evaluate and treat  -     XR Knee 3 View Right; Future  -     XR Spine Lumbar 2 or 3 View; Future  -     naproxen (Naprosyn) 500 MG tablet; Take 1 tablet by mouth 2 (Two) Times a Day With Meals for 7 days.  Dispense: 14 tablet; Refill: 0  -     methylPREDNISolone (MEDROL) 4 MG dose pack; Take as directed on package instructions.  Dispense: 21 tablet; Refill: 0         Return in about 6 weeks (around 8/3/2022).     Dictated Utilizing Dragon Dictation    Please note that portions of this note were completed with a voice recognition program.    Part of this note may be an electronic transcription/translation of spoken language to printed text using the Dragon Dictation System.

## 2022-07-06 ENCOUNTER — HOSPITAL ENCOUNTER (OUTPATIENT)
Dept: PHYSICAL THERAPY | Facility: HOSPITAL | Age: 27
Setting detail: THERAPIES SERIES
Discharge: HOME | End: 2022-07-06

## 2022-07-06 DIAGNOSIS — M25.559 HIP PAIN: Primary | ICD-10-CM

## 2022-07-06 DIAGNOSIS — M54.50 CHRONIC RIGHT-SIDED LOW BACK PAIN, UNSPECIFIED WHETHER SCIATICA PRESENT: ICD-10-CM

## 2022-07-06 DIAGNOSIS — M25.561 CHRONIC PAIN OF RIGHT KNEE: ICD-10-CM

## 2022-07-06 DIAGNOSIS — G89.29 CHRONIC PAIN OF RIGHT KNEE: ICD-10-CM

## 2022-07-06 DIAGNOSIS — G89.29 CHRONIC RIGHT-SIDED LOW BACK PAIN, UNSPECIFIED WHETHER SCIATICA PRESENT: ICD-10-CM

## 2022-07-06 PROCEDURE — 97161 PT EVAL LOW COMPLEX 20 MIN: CPT | Performed by: GENERAL ACUTE CARE HOSPITAL

## 2022-07-06 NOTE — THERAPY EVALUATION
Outpatient Physical Therapy Ortho Initial Evaluation  [facility]     Patient Name: Krish Patterson  : 1995  MRN: [id number]  Today's Date: 2022      Visit Date: 2022    There is no problem list on file for this patient.       Past Medical History:   Diagnosis Date   • Headache         No past surgical history on file.    Visit Dx:     ICD-10-CM ICD-9-CM   1. Hip pain  M25.559 719.45   2. Chronic right-sided low back pain, unspecified whether sciatica present  M54.50 724.2    G89.29 338.29   3. Chronic pain of right knee  M25.561 719.46    G89.29 338.29          Patient History     Row Name 22 1000             History    Chief Complaint Pain;Joint stiffness;Difficulty with daily activities  -HP (r) DM (t) HP (c)      Type of Pain Back pain;Hip pain;Knee pain  -HP (r) DM (t) HP (c)      Date Current Problem(s) Began --  several months ago  -HP (r) DM (t) HP (c)      Brief Description of Current Complaint Patient reporting several months ago he began having right sided hip and knee pain, this began insidiously without injuiry. He sometimes has pain into his ankle, but mostly localized to hip and knee. Does report some low back pain as well.  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  -HP (r) DM (t) HP (c)      How has patient tried to help current problem? stretching, medication  -HP (r) DM (t) HP (c)              Pain     Pain Location Hip;Knee;Back  -HP (r) DM (t) HP (c)      Pain at Present 4  -HP (r) DM (t) HP (c)      Pain at Best 0  -HP (r) DM (t) HP (c)      Pain at Worst 9  -HP (r) DM (t) HP (c)      Pain Frequency Intermittent  -HP (r) DM (t) HP (c)      Pain Description Sharp;Radiating  -HP (r) DM (t) HP (c)      What Performance Factors Make the  Current Problem(s) WORSE? lifting, being very active  -HP (r) DM (t) HP (c)      What Performance Factors Make the Current Problem(s) BETTER? medication, sometimes stretching  -HP (r) DM (t) HP (c)      Is your sleep disturbed? Yes  -HP (r) DM (t) HP (c)      Difficulties at work? yes, with lifting heavy boxes  -HP (r) DM (t) HP (c)              Daily Activities    Primary Language English  -HP (r) DM (t) HP (c)      Barriers to learning None  -HP (r) DM (t) HP (c)      Pt Participated in POC and Goals Yes  -HP (r) DM (t) HP (c)            User Key  (r) = Recorded By, (t) = Taken By, (c) = Cosigned By    Initials Name Provider Type    Ellis Montana, PT Physical Therapist    DM Jovanna Peña, PT Student PT Student                 PT Ortho     Row Name 07/06/22 1000       Precautions and Contraindications    Precautions/Limitations no known precautions/limitations  -HP (r) DM (t) HP (c)       Posture/Observations    Posture/Observations Comments Patient with unremarkable posture in standing, equal weight bearing bilaterally with no lateral shift present. Patient has TTP along right lumbar paraspinals and media gluteal muscles and reports he can feel this pain into his hip when this is palpated.  -HP (r) DM (t) HP (c)       Quarter Clearing    Quarter Clearing Lower Quarter Clearing  -HP (r) DM (t) HP (c)       Sensory Screen for Light Touch- Lower Quarter Clearing    L1 (inguinal area) Intact  -HP (r) DM (t) HP (c)    L2 (anterior mid thigh) Intact  -HP (r) DM (t) HP (c)    L3 (distal anterior thigh) Intact  -HP (r) DM (t) HP (c)    L4 (medial lower leg/foot) Intact  -HP (r) DM (t) HP (c)    L5 (lateral lower leg/great toe) Intact  -HP (r) DM (t) HP (c)    S1 (bottom of foot) Intact  -HP (r) DM (t) HP (c)       Lumbar ROM Screen- Lower Quarter Clearing    Lumbar Flexion Normal  slight pain in right low back  -HP (r) DM (t) HP (c)    Lumbar Extension Normal  pain in hip and knee  -HP (r) DM (t) HP (c)     Lumbar Lateral Flexion Normal  left lateral flexion reproduces pain in hip and knee  -HP (r) DM (t) HP (c)    Lumbar Rotation Normal  pain in right hip  -HP (r) DM (t) HP (c)       Special Tests/Palpation    Special Tests/Palpation Hip  -HP (r) DM (t) HP (c)       Hip/Thigh Palpation    Hip/Thigh Palpation? --  Hip pain reproduced with hip flexion and internal rotation quadrant. No pain reported with hip posterior shear test. Pain and hip symptoms reproduced with figure four stretching of the right hip.  -HP (r) DM (t) HP (c)       MMT (Manual Muscle Testing)    Rt Lower Ext Rt Hip Flexion;Rt Hip ABduction;Rt Hip ADduction;Rt Knee Extension;Rt Knee Flexion;Rt Ankle Plantarflexion;Rt Ankle Dorsiflexion  -HP (r) DM (t) HP (c)    Lt Lower Ext Lt Hip Flexion;Lt Hip ABduction;Lt Hip ADduction;Lt Knee Extension;Lt Knee Flexion;Lt Ankle Plantarflexion;Lt Ankle Dorsiflexion  -HP (r) DM (t) HP (c)       MMT Right Lower Ext    Rt Hip Flexion MMT, Gross Movement (4+/5) good plus  -HP (r) DM (t) HP (c)    Rt Hip ABduction MMT, Gross Movement (4+/5) good plus  -HP (r) DM (t) HP (c)    Rt Hip ADduction MMT, Gross Movement (4+/5) good plus  -HP (r) DM (t) HP (c)    Rt Knee Extension MMT, Gross Movement (4+/5) good plus  pain in right hip  -HP (r) DM (t) HP (c)    Rt Knee Flexion MMT, Gross Movement (4+/5) good plus  -HP (r) DM (t) HP (c)    Rt Ankle Plantarflexion MMT, Gross Movement (4+/5) good plus  -HP (r) DM (t) HP (c)    Rt Ankle Dorsiflexion MMT, Gross Movement (4+/5) good plus  -HP (r) DM (t) HP (c)       MMT Left Lower Ext    Lt Hip Flexion MMT, Gross Movement (4+/5) good plus  -HP (r) DM (t) HP (c)    Lt Hip ABduction MMT, Gross Movement (4+/5) good plus  -HP (r) DM (t) HP (c)    Lt Hip ADduction MMT, Gross Movement (4+/5) good plus  -HP (r) DM (t) HP (c)    Lt Knee Extension MMT, Gross Movement (4+/5) good plus  -HP (r) DM (t) HP (c)    Lt Knee Flexion MMT, Gross Movement (4+/5) good plus  -HP (r) DM (t) HP (c)    Lt  Ankle Plantarflexion MMT, Gross Movement (4+/5) good plus  -HP (r) DM (t) HP (c)    Lt Ankle Dorsiflexion MMT, Gross Movement (4+/5) good plus  -HP (r) DM (t) HP (c)       Sensation    Sensation WNL? WNL  -HP (r) DM (t) HP (c)          User Key  (r) = Recorded By, (t) = Taken By, (c) = Cosigned By    Initials Name Provider Type    HP Ellis Armstrong, PT Physical Therapist    Jovanna De La Cruz, PT Student PT Student                            Therapy Education  Education Details: HEP provided including: prone press up, supine hip stretching, piriformis stretch, modified piriformis stretch.  Given: HEP, Symptoms/condition management  Program: New  How Provided: Verbal, Demonstration, Written  Provided to: Patient  Level of Understanding: Teach back education performed, Verbalized, Demonstrated      PT OP Goals     Row Name 07/06/22 1000          PT Short Term Goals    STG Date to Achieve 07/27/22  -HP (r) DM (t) HP (c)     STG 1 Patient will self report improvement on LEFS to be greater than or equal to 65/80.  -HP (r) DM (t) HP (c)     STG 1 Progress New  -HP (r) DM (t) HP (c)     STG 2 Patient will report 50% improvement in symptoms.  -HP (r) DM (t) HP (c)     STG 2 Progress New  -HP (r) DM (t) HP (c)     STG 3 Patient will report no pain in the knee in the last 3 days.  -HP (r) DM (t) HP (c)     STG 3 Progress New  -HP (r) DM (t) HP (c)            Long Term Goals    LTG Date to Achieve 08/17/22  -HP (r) DM (t) HP (c)     LTG 1 Patient will be independent in HEP.  -HP (r) DM (t) HP (c)     LTG 1 Progress New  -HP (r) DM (t) HP (c)     LTG 2 Patient will self report improvement on LEFS to be greater than or equal to 72/80.  -HP (r) DM (t) HP (c)     LTG 2 Progress New  -HP (r) DM (t) HP (c)     LTG 3 Patient will report 75% improvement in symptoms.  -HP (r) DM (t) HP (c)     LTG 3 Progress New  -HP (r) DM (t) HP (c)     LTG 4 Patient will report no pain in the hip or knee in the last week.  -HP (r) DM (t) HP (c)      LTG 4 Progress New  -HP (r) DM (t) HP (c)            Time Calculation    PT Goal Re-Cert Due Date 10/04/22  -HP (r) DM (t) HP (c)           User Key  (r) = Recorded By, (t) = Taken By, (c) = Cosigned By    Initials Name Provider Type    HP Ellis Armstrong, PT Physical Therapist    Jovanna De La Cruz, PT Student PT Student                 PT Assessment/Plan     Row Name 07/06/22 1000          PT Assessment    Functional Limitations Limitations in community activities;Performance in work activities;Performance in sport activities;Performance in leisure activities  -HP (r) DM (t) HP (c)     Impairments Joint mobility;Pain;Poor body mechanics  -HP (r) DM (t) HP (c)     Assessment Comments Patient is a 25 YO male who presents with a low complexity evolving case of right hip and knee pain. Patient reports that this began insidiously several months ago. 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 Patient has mild TTP along the right lumbar paraspinal muscles and this does radiate pain to the hip and knee upon direct palpation. Patient demonstrates tight hip musculature with figure 4 stretching and piriformis stretching of the right hip. With prone press ups and with therapist applied over pressure, the pain in the patient's hip and knee decreased with each set. The patient's symptoms are consistent with both tight hip musculature as well as low back pain that is referring pain to the hip and knee. Skilled therapy services are required in order to address these deficits and allow patient to return to [facility].  -HP (r) DM (t) HP (c)     Please refer to paper survey for additional self-reported information Yes  -HP (r) DM (t) HP (c)     Rehab Potential Good  -HP (r) DM (t) HP (c)     Patient/caregiver participated in establishment of treatment plan and goals Yes  -HP (r) DM (t) HP (c)     Patient would benefit from skilled therapy intervention Yes  -HP (r) DM (t) HP (c)            PT Plan    PT  Frequency 2x/week;1x/week  -HP (r) DM (t) HP (c)     Predicted Duration of Therapy Intervention (PT) 8-10 visits  -HP (r) DM (t) HP (c)     Planned CPT's? PT EVAL LOW COMPLEXITY: 91923;PT RE-EVAL: 82743;PT THER PROC EA 15 MIN: 55031;PT THER ACT EA 15 MIN: 99213;PT MANUAL THERAPY EA 15 MIN: 42441;PT NEUROMUSC RE-EDUCATION EA 15 MIN: 94000;PT SELF CARE/HOME MGMT/TRAIN EA 15: 03618  -HP (r) DM (t) HP (c)     Physical Therapy Interventions (Optional Details) home exercise program;manual therapy techniques;joint mobilization;neuromuscular re-education;patient/family education;postural re-education;ROM (Range of Motion);stretching;strengthening  -HP (r) DM (t) HP (c)     PT Plan Comments PT POC to include stretching of tight hip and LE musculature, functional strengthening, and manual therapy to return patient to PLOF.  -HP (r) DM (t) HP (c)           User Key  (r) = Recorded By, (t) = Taken By, (c) = Cosigned By    Initials Name Provider Type    Ellis Montana, PT Physical Therapist    Jovanna De La Cruz, PT Student PT Student                                    Outcome Measure Options: Lower Extremity Functional Scale (LEFS)  Lower Extremity Functional Index  Any of your usual work, housework or school activities: Moderate difficulty  Your usual hobbies, recreational or sporting activities: Moderate difficulty  Getting into or out of the bath: No difficulty  Walking between rooms: No difficulty  Putting on your shoes or socks: A little bit of difficulty  Squatting: A little bit of difficulty  Lifting an object, like a bag of groceries from the floor: A little bit of difficulty  Performing light activities around your home: No difficulty  Performing heavy activities around your home: Moderate difficulty  Getting into or out of a car: A little bit of difficulty  Walking 2 blocks: A little bit of difficulty  Walking a mile: Moderate difficulty  Going up or down 10 stairs (about 1 flight of stairs): Moderate  difficulty  Standing for 1 hour: Quite a bit of difficulty  Sitting for 1 hour: No difficulty  Running on even ground: A little bit of difficulty  Running on uneven ground: Moderate difficulty  Making sharp turns while running fast: A little bit of difficulty  Hopping: No difficulty  Rolling over in bed: A little bit of difficulty  Total: 57      Time Calculation:     Start Time: 1000  Untimed Charges  PT Eval/Re-eval Minutes: 60  Total Minutes  Untimed Charges Total Minutes: 60   Total Minutes: 60     Therapy Charges for Today     Code Description Service Date Service Provider Modifiers Qty    39210123956 HC PT EVAL LOW COMPLEXITY 4 7/6/2022 Ellis Armstrong, PT GP 1          PT G-Codes  Outcome Measure Options: Lower Extremity Functional Scale (LEFS)  Total: 57         Ellis Armstrong PT  7/6/2022

## 2022-07-13 ENCOUNTER — HOSPITAL ENCOUNTER (OUTPATIENT)
Dept: PHYSICAL THERAPY | Facility: HOSPITAL | Age: 27
Setting detail: THERAPIES SERIES
Discharge: HOME | End: 2022-07-13

## 2022-07-13 DIAGNOSIS — M25.559 HIP PAIN: ICD-10-CM

## 2022-07-13 DIAGNOSIS — M54.50 CHRONIC RIGHT-SIDED LOW BACK PAIN, UNSPECIFIED WHETHER SCIATICA PRESENT: Primary | ICD-10-CM

## 2022-07-13 DIAGNOSIS — G89.29 CHRONIC PAIN OF RIGHT KNEE: ICD-10-CM

## 2022-07-13 DIAGNOSIS — M25.561 CHRONIC PAIN OF RIGHT KNEE: ICD-10-CM

## 2022-07-13 DIAGNOSIS — G89.29 CHRONIC RIGHT-SIDED LOW BACK PAIN, UNSPECIFIED WHETHER SCIATICA PRESENT: Primary | ICD-10-CM

## 2022-07-13 PROCEDURE — 97110 THERAPEUTIC EXERCISES: CPT

## 2022-07-13 NOTE — THERAPY TREATMENT NOTE
Outpatient Physical Therapy Ortho Treatment Note  [facility]     Patient Name: Krish Patterson  : 1995  MRN: [id number]  Today's Date: 2022      Visit Date: 2022    Visit Dx:    ICD-10-CM ICD-9-CM   1. Chronic right-sided low back pain, unspecified whether sciatica present  M54.50 724.2    G89.29 338.29   2. Hip pain  M25.559 719.45   3. Chronic pain of right knee  M25.561 719.46    G89.29 338.29      Past Medical History:   Diagnosis Date   • Headache         PT Assessment/Plan     Row Name 22 0900          PT Assessment    Assessment Comments No pain at rest today. He had mild soreness with exercise activity. He has some increased sciatic nerve tension on the right.  -MM            PT Plan    PT Plan Comments Continue per plan of treatment updating exercises as needed.  -MM           User Key  (r) = Recorded By, (t) = Taken By, (c) = Cosigned By    Initials Name Provider Type    Partha Baltazar, PT Physical Therapist                 OP Exercises     Row Name 22 09             Subjective Comments    Subjective Comments Client reports no pain at rest and that he just woke up.  -MM              Subjective Pain    Able to rate subjective pain? yes  -MM      Pre-Treatment Pain Level 0  -MM      Post-Treatment Pain Level 0  -MM              Total Minutes    75314 - PT Therapeutic Exercise Minutes 30  -MM              Exercise 1    Exercise Name 1 standing extension  -MM      Reps 1 10  -MM              Exercise 2    Exercise Name 2 standing ball squats  -MM      Reps 2 15  -MM              Exercise 3    Exercise Name 3 press ups  -MM      Reps 3 15  -MM              Exercise 4    Exercise Name 4 supermans  -MM      Reps 4 15  -MM              Exercise 5    Exercise Name 5 birddogs  -MM      Reps 5 10 ea  -MM              Exercise 6    Exercise Name 6 kneeling ball roll outs  -MM      Reps 6 15  -MM              Exercise 7    Exercise Name 7 harry curl up  -MM      Reps  7 10 ea  -MM              Exercise 8    Exercise Name 8 bridge and curl  -MM      Reps 8 15  -MM              Exercise 9    Exercise Name 9 starfish from knee  -MM      Reps 9 10/10  -MM              Exercise 10    Exercise Name 10 leg across stretch w sheet  -MM      Time 10 2 min  -MM              Exercise 11    Exercise Name 11 hamstring stretch  -MM      Time 11 2 min  -MM            User Key  (r) = Recorded By, (t) = Taken By, (c) = Cosigned By    Initials Name Provider Type     Partha Paez, PT Physical Therapist              Time Calculation:   Start Time: 0900  Timed Charges  17770 - PT Therapeutic Exercise Minutes: 30  Total Minutes  Timed Charges Total Minutes: 30   Total Minutes: 30  Therapy Charges for Today     Code Description Service Date Service Provider Modifiers Qty    53645722078  PT THER PROC EA 15 MIN 7/13/2022 Partha Paez, PT GP 2        Partha Paez, PT  7/13/2022

## 2022-07-18 ENCOUNTER — HOSPITAL ENCOUNTER (OUTPATIENT)
Dept: PHYSICAL THERAPY | Facility: HOSPITAL | Age: 27
Setting detail: THERAPIES SERIES
Discharge: HOME | End: 2022-07-18

## 2022-07-18 DIAGNOSIS — M25.559 HIP PAIN: ICD-10-CM

## 2022-07-18 DIAGNOSIS — M25.561 CHRONIC PAIN OF RIGHT KNEE: ICD-10-CM

## 2022-07-18 DIAGNOSIS — G89.29 CHRONIC PAIN OF RIGHT KNEE: ICD-10-CM

## 2022-07-18 DIAGNOSIS — G89.29 CHRONIC RIGHT-SIDED LOW BACK PAIN, UNSPECIFIED WHETHER SCIATICA PRESENT: Primary | ICD-10-CM

## 2022-07-18 DIAGNOSIS — M54.50 CHRONIC RIGHT-SIDED LOW BACK PAIN, UNSPECIFIED WHETHER SCIATICA PRESENT: Primary | ICD-10-CM

## 2022-07-18 PROCEDURE — 97110 THERAPEUTIC EXERCISES: CPT

## 2022-07-18 NOTE — THERAPY TREATMENT NOTE
Outpatient Physical Therapy Ortho Treatment Note  [facility]     Patient Name: Krish Patterson  : 1995  MRN: [id number]  Today's Date: 2022      Visit Date: 2022    Visit Dx:    ICD-10-CM ICD-9-CM   1. Chronic right-sided low back pain, unspecified whether sciatica present  M54.50 724.2    G89.29 338.29   2. Hip pain  M25.559 719.45   3. Chronic pain of right knee  M25.561 719.46    G89.29 338.29       There is no problem list on file for this patient.       Past Medical History:   Diagnosis Date   • Headache         No past surgical history on file.                     PT Assessment/Plan     Row Name 22 0945          PT Assessment    Assessment Comments Patient reporting increased hip tightness in bilateral hips at onset of session. He performs well with exercises but notes some increased pain in left hip with some resistance exercises. Pain is decreased and mobility increased at conclusion of session. (P)   -DM            PT Plan    PT Plan Comments Continue with current POC and progress as tolerated. (P)   -DM           User Key  (r) = Recorded By, (t) = Taken By, (c) = Cosigned By    Initials Name Provider Type    Jovanna De La Cruz, PT Student PT Student                   OP Exercises     Row Name 22 0945             Subjective Comments    Subjective Comments Patient reporting some hip tightness and that his hip usually feels worse in the mornings. (P)   -DM              Subjective Pain    Able to rate subjective pain? yes (P)   -DM      Pre-Treatment Pain Level 2 (P)   -DM      Post-Treatment Pain Level 1 (P)   -DM              Total Minutes    23306 - PT Therapeutic Exercise Minutes 40 (P)   -DM              Exercise 1    Exercise Name 1 nustep (P)   -DM      Time 1 5 min (P)   -DM              Exercise 2    Exercise Name 2 prone hip extension (P)   -DM      Sets 2 2 (P)   -DM      Reps 2 15 (P)   -DM              Exercise 3    Exercise Name 3 press ups (P)   -DM       Sets 3 3 (P)   -DM      Reps 3 15 (P)   -DM              Exercise 4    Exercise Name 4 supermans (P)   -DM      Reps 4 15 (P)   -DM              Exercise 5    Exercise Name 5 birddogs (P)   -DM      Reps 5 20 (P)   -DM              Exercise 6    Exercise Name 6 hip flexor stretch (P)   -DM      Time 6 1 min (P)   -DM              Exercise 7    Exercise Name 7 clams/reverse clams (P)   -DM      Sets 7 2 (P)   -DM      Reps 7 15 (P)   -DM              Exercise 8    Exercise Name 8 bridge and curl (P)   -DM      Sets 8 2 (P)   -DM      Reps 8 15 (P)   -DM              Exercise 9    Exercise Name 9 3 way hip machine (P)   -DM      Reps 9 15 all (P)   -DM      Additional Comments 2 plate/1 plate (P)   -DM              Exercise 10    Exercise Name 10 piriformis stretch (P)   -DM      Time 10 1 min R (P)   -DM              Exercise 11    Exercise Name 11 hamstring stretch (P)   -DM      Time 11 1 min each (P)   -DM            User Key  (r) = Recorded By, (t) = Taken By, (c) = Cosigned By    Initials Name Provider Type    DM Wing Peña, PT Student PT Student                                                Time Calculation:   Start Time: (P) 0945  Timed Charges  76718 - PT Therapeutic Exercise Minutes: (P) 40  Total Minutes  Timed Charges Total Minutes: (P) 40   Total Minutes: (P) 40  Therapy Charges for Today     Code Description Service Date Service Provider Modifiers Qty    22563500716 HC PT THER PROC EA 15 MIN 7/18/2022 Wing Peña, PT Student GP 3                    WING PEÑA, PT Student  7/18/2022

## 2022-10-06 ENCOUNTER — DOCUMENTATION (OUTPATIENT)
Dept: PHYSICAL THERAPY | Facility: HOSPITAL | Age: 27
End: 2022-10-06

## 2022-10-06 DIAGNOSIS — M25.559 HIP PAIN: ICD-10-CM

## 2022-10-06 DIAGNOSIS — G89.29 CHRONIC PAIN OF RIGHT KNEE: ICD-10-CM

## 2022-10-06 DIAGNOSIS — M54.50 CHRONIC RIGHT-SIDED LOW BACK PAIN, UNSPECIFIED WHETHER SCIATICA PRESENT: Primary | ICD-10-CM

## 2022-10-06 DIAGNOSIS — M25.561 CHRONIC PAIN OF RIGHT KNEE: ICD-10-CM

## 2022-10-06 DIAGNOSIS — G89.29 CHRONIC RIGHT-SIDED LOW BACK PAIN, UNSPECIFIED WHETHER SCIATICA PRESENT: Primary | ICD-10-CM

## 2022-10-06 NOTE — THERAPY DISCHARGE NOTE
Outpatient Physical Therapy Discharge Summary         Patient Name: Krish Patterson  : 1995  MRN: [id number]    Today's Date: 10/6/2022    Visit Dx:    ICD-10-CM ICD-9-CM   1. Chronic right-sided low back pain, unspecified whether sciatica present  M54.50 724.2    G89.29 338.29   2. Hip pain  M25.559 719.45   3. Chronic pain of right knee  M25.561 719.46    G89.29 338.29           OP PT Discharge Summary  Date of Discharge: 10/06/22  Discharge Instructions/Additional Comments: Patient was seen for his initial evaluation and to follow-up visits.  However, patient no showed his last scheduled visit and did not call to reschedule.  Prognosis at time of discharge is fair to good based off of patient status at last visit.      Time Calculation:                    Ellis Armstrong, PT  10/6/2022